# Patient Record
Sex: MALE | Race: WHITE | NOT HISPANIC OR LATINO | URBAN - NONMETROPOLITAN AREA
[De-identification: names, ages, dates, MRNs, and addresses within clinical notes are randomized per-mention and may not be internally consistent; named-entity substitution may affect disease eponyms.]

---

## 2018-09-11 ENCOUNTER — TELEPHONE (OUTPATIENT)
Dept: URGENT CARE | Facility: CLINIC | Age: 48
End: 2018-09-11

## 2021-06-23 DIAGNOSIS — Z12.11 SCREENING FOR COLON CANCER: Primary | ICD-10-CM

## 2021-07-08 ENCOUNTER — OUTSIDE FACILITY SERVICE (OUTPATIENT)
Dept: GASTROENTEROLOGY | Facility: CLINIC | Age: 51
End: 2021-07-08

## 2021-07-08 PROCEDURE — 88305 TISSUE EXAM BY PATHOLOGIST: CPT | Performed by: INTERNAL MEDICINE

## 2021-07-08 PROCEDURE — 45380 COLONOSCOPY AND BIOPSY: CPT | Performed by: INTERNAL MEDICINE

## 2021-07-09 ENCOUNTER — LAB REQUISITION (OUTPATIENT)
Dept: LAB | Facility: HOSPITAL | Age: 51
End: 2021-07-09

## 2021-07-09 DIAGNOSIS — Z12.11 ENCOUNTER FOR SCREENING FOR MALIGNANT NEOPLASM OF COLON: ICD-10-CM

## 2021-07-12 LAB
CYTO UR: NORMAL
LAB AP CASE REPORT: NORMAL
LAB AP CLINICAL INFORMATION: NORMAL
LAB AP DIAGNOSIS COMMENT: NORMAL
PATH REPORT.FINAL DX SPEC: NORMAL
PATH REPORT.GROSS SPEC: NORMAL

## 2021-07-13 ENCOUNTER — TELEPHONE (OUTPATIENT)
Dept: GASTROENTEROLOGY | Facility: CLINIC | Age: 51
End: 2021-07-13

## 2021-07-14 ENCOUNTER — TELEPHONE (OUTPATIENT)
Dept: GASTROENTEROLOGY | Facility: CLINIC | Age: 51
End: 2021-07-14

## 2021-07-14 NOTE — TELEPHONE ENCOUNTER
I spoke with Mr. Vazquez this afternoon regarding the pathology.  There was an ulcer on ileocecal valve.  He denies taking any nonsteroidal medication a regular basis.  There is no known history of influenza bowel disease in the family.  The other colon biopsies were all normal in addition to the terminal ileum biopsy which was normal.  I stated this certainly can be due to NSAIDs or an infectious etiology.  The plan at this time will be for the patient to return to the office for a follow-up visit in 6 months.

## 2022-01-13 ENCOUNTER — OFFICE VISIT (OUTPATIENT)
Dept: GASTROENTEROLOGY | Facility: CLINIC | Age: 52
End: 2022-01-13

## 2022-01-13 DIAGNOSIS — K64.8 INTERNAL HEMORRHOIDS: Primary | ICD-10-CM

## 2022-01-13 PROCEDURE — 99212 OFFICE O/P EST SF 10 MIN: CPT | Performed by: INTERNAL MEDICINE

## 2022-01-13 RX ORDER — PHENAZOPYRIDINE HYDROCHLORIDE 95 MG/1
TABLET ORAL
COMMUNITY
End: 2023-01-13

## 2022-01-13 NOTE — PROGRESS NOTES
You have chosen to receive care through a telephone visit. Do you consent to use a telephone visit for your medical care today? Yes  Patient Name: Joo Vazquez  YOB: 1970   Medical Record number: [id number]     PCP: Provider, No Known    Chief Complaint   Patient presents with   • Follow-up     6 month    Follow-up from colonoscopy.    History of Present Illness:   HPI  This was a telephone visit.  The patient consented for telephone visit.  Mr. Vazquez is doing well at this time.  He denies any problems with diarrhea or constipation.  There is no history of vonda blood in the stool.  Mr. Vazquez has no abdominal pain at this time.  The patient a good appetite.  There is no history of unexplained weight loss.  He does take flaxseed and some other multivitamins.  The patient denies any current use of nonsteroidal medications.  Past Medical History:   Diagnosis Date   • COVID-19 01/02/2022       Past Surgical History:   Procedure Laterality Date   • COLONOSCOPY           Current Outpatient Medications:   •  Flaxseed Oil oil, 1,200 mg., Disp: , Rfl:   •  Glucosamine-Chondroit-Vit C-Mn (GLUCOSAMINE 1500 COMPLEX PO), Take  by mouth 2 (Two) Times a Day., Disp: , Rfl:   •  clotrimazole (LOTRIMIN AF) 1 % cream, Apply to affected area bid, Disp: 30 g, Rfl: 0  •  fluconazole (DIFLUCAN) 150 MG tablet, 1 po q 3 days, Disp: 3 tablet, Rfl: 1  •  fluconazole (DIFLUCAN) 150 MG tablet, Take 1 tablet by mouth Daily., Disp: 3 tablet, Rfl: 0  •  phenazopyridine (PYRIDIUM) 95 MG tablet, Take  by mouth., Disp: , Rfl:   •  Sod Picosulfate-Mag Ox-Cit Acd 10-3.5-12 MG-GM -GM/160ML solution, Take 1 kit by mouth Take As Directed. Follow instructions mailed to your home. If you didn't receive instructions; call [phone]., Disp: 320 mL, Rfl: 0    No Known Allergies    Family History   Problem Relation Age of Onset   • Diabetes Mother    • Hyperlipidemia Father        Social History     Socioeconomic History   • Marital  status:    Tobacco Use   • Smoking status: Never Smoker   • Smokeless tobacco: Never Used   Vaping Use   • Vaping Use: Never used   Substance and Sexual Activity   • Alcohol use: No       Review of Systems    There were no vitals filed for this visit.    Physical Exam    Diagnoses and all orders for this visit:    1. Internal hemorrhoids (Primary)    The patient was found to have internal hemorrhoids on the colonoscopy exam.  There was also an ileocecal valve ulcer that likely was related to preparation and nonsteroidal medication.  Biopsies were unremarkable from the colon and the terminal ileum.  There is no family history of inflammatory bowel disease.  He denies any current gastrointestinal complaints.    Plan: The patient will follow-up in the office on an as-needed basis.           The patient was encouraged to seek a primary care physician.      This was a telephone visit for 10 minutes.

## 2023-01-13 ENCOUNTER — OFFICE VISIT (OUTPATIENT)
Dept: FAMILY MEDICINE CLINIC | Facility: CLINIC | Age: 53
End: 2023-01-13

## 2023-01-13 VITALS — BODY MASS INDEX: 26.79 KG/M2 | HEART RATE: 67 BPM | WEIGHT: 220 LBS | OXYGEN SATURATION: 96 % | HEIGHT: 76 IN

## 2023-01-13 DIAGNOSIS — Z13.220 LIPID SCREENING: ICD-10-CM

## 2023-01-13 DIAGNOSIS — Z12.5 PROSTATE CANCER SCREENING: ICD-10-CM

## 2023-01-13 DIAGNOSIS — Z00.00 ROUTINE GENERAL MEDICAL EXAMINATION AT A HEALTH CARE FACILITY: Primary | ICD-10-CM

## 2023-01-13 PROCEDURE — 36415 COLL VENOUS BLD VENIPUNCTURE: CPT | Performed by: FAMILY MEDICINE

## 2023-01-13 PROCEDURE — 3008F BODY MASS INDEX DOCD: CPT | Performed by: FAMILY MEDICINE

## 2023-01-13 PROCEDURE — 2014F MENTAL STATUS ASSESS: CPT | Performed by: FAMILY MEDICINE

## 2023-01-13 PROCEDURE — 99386 PREV VISIT NEW AGE 40-64: CPT | Performed by: FAMILY MEDICINE

## 2023-01-13 NOTE — PROGRESS NOTES
"Chief Complaint  Establish Care    Subjective          Joo Vazquez presents to [facility]  History of Present Illness  Patient comes in today to establish care he wants a physical done as well he reports he had a colonoscopy probably 2 years ago he has had some mild symptoms of BPH for 5 or 6 years he states otherwise has been pretty healthy he does basically is a  he does saying he says otherwise he has been relatively healthy as far as he knows he still does some exercise and is quite active      Objective   Vital Signs:   Pulse 67   Ht 193 cm (76\")   Wt 99.8 kg (220 lb)   SpO2 96%   BMI 26.78 kg/m²     Body mass index is 26.78 kg/m².    Review of Systems   Constitutional: Negative.    HENT: Negative for congestion, dental problem, ear discharge, ear pain and sore throat.    Respiratory: Negative for apnea, chest tightness and shortness of breath.    Gastrointestinal: Negative for constipation and nausea.   Endocrine: Negative for polyuria.   Genitourinary: Positive for frequency. Negative for difficulty urinating.   Musculoskeletal: Negative for arthralgias and gait problem.   Skin: Negative for rash.   Hematological: Negative for adenopathy.       Past History:  Medical History: has a past medical history of COVID-19 (01/02/2022).   Surgical History: has a past surgical history that includes Colonoscopy.         Current Outpatient Medications:   •  Flaxseed Oil oil, 1,200 mg., Disp: , Rfl:   •  Glucosamine-Chondroit-Vit C-Mn (GLUCOSAMINE 1500 COMPLEX PO), Take  by mouth 2 (Two) Times a Day., Disp: , Rfl:     Allergies: Patient has no known allergies.    Physical Exam  Vitals reviewed.   Constitutional:       Appearance: Normal appearance.   HENT:      Head: Normocephalic.      Right Ear: Tympanic membrane, ear canal and external ear normal.      Left Ear: Tympanic membrane, ear canal and external ear normal.      Nose: Nose normal.      Mouth/Throat:      Pharynx: " Oropharynx is clear.   Eyes:      Pupils: Pupils are equal, round, and reactive to light.   Cardiovascular:      Rate and Rhythm: Normal rate and regular rhythm.      Pulses: Normal pulses.   Pulmonary:      Effort: Pulmonary effort is normal.      Breath sounds: Normal breath sounds.   Abdominal:      General: Abdomen is flat. Bowel sounds are normal.      Palpations: Abdomen is soft.   Musculoskeletal:         General: Normal range of motion.   Skin:     General: Skin is warm and dry.   Neurological:      General: No focal deficit present.      Mental Status: He is alert and oriented to person, place, and time.          Result Review :                   Assessment and Plan    Diagnoses and all orders for this visit:    1. Routine general medical examination at a health care facility (Primary)  Comments:  Discussed diet exercise activity colonoscopy up-to-date we will get blood work  Orders:  -     Comprehensive Metabolic Panel; Future  -     Comprehensive Metabolic Panel    2. Lipid screening  Comments:  We will get blood work and monitor  Orders:  -     Lipid Panel; Future  -     Lipid Panel    3. Prostate cancer screening  Comments:  Discussed symptoms and will monitor  Orders:  -     PSA Screen; Future  -     PSA Screen    Updated annual wellness visit checklist.  Immunizations discussed.  Screening up-to-date.  Recommend yearly dental and eye exams. Also discussed monitoring of blood pressure and lipids.            Follow Up   No follow-ups on file.  Patient was given instructions and counseling regarding his condition or for health maintenance advice. Please see specific information pulled into the AVS if appropriate.     Salomón Obrien MD

## 2023-01-14 LAB
ALBUMIN SERPL-MCNC: 4.7 G/DL (ref 3.8–4.9)
ALBUMIN/GLOB SERPL: 2.2 {RATIO} (ref 1.2–2.2)
ALP SERPL-CCNC: 75 IU/L (ref 44–121)
ALT SERPL-CCNC: 17 IU/L (ref 0–44)
AST SERPL-CCNC: 18 IU/L (ref 0–40)
BILIRUB SERPL-MCNC: 0.5 MG/DL (ref 0–1.2)
BUN SERPL-MCNC: 12 MG/DL (ref 6–24)
BUN/CREAT SERPL: 12 (ref 9–20)
CALCIUM SERPL-MCNC: 9.7 MG/DL (ref 8.7–10.2)
CHLORIDE SERPL-SCNC: 104 MMOL/L (ref 96–106)
CHOLEST SERPL-MCNC: 198 MG/DL (ref 100–199)
CO2 SERPL-SCNC: 24 MMOL/L (ref 20–29)
CREAT SERPL-MCNC: 1.04 MG/DL (ref 0.76–1.27)
EGFRCR SERPLBLD CKD-EPI 2021: 86 ML/MIN/1.73
GLOBULIN SER CALC-MCNC: 2.1 G/DL (ref 1.5–4.5)
GLUCOSE SERPL-MCNC: 94 MG/DL (ref 70–99)
HDLC SERPL-MCNC: 44 MG/DL
LDLC SERPL CALC-MCNC: 130 MG/DL (ref 0–99)
POTASSIUM SERPL-SCNC: 5.2 MMOL/L (ref 3.5–5.2)
PROT SERPL-MCNC: 6.8 G/DL (ref 6–8.5)
PSA SERPL-MCNC: 0.7 NG/ML (ref 0–4)
SODIUM SERPL-SCNC: 144 MMOL/L (ref 134–144)
TRIGL SERPL-MCNC: 136 MG/DL (ref 0–149)
VLDLC SERPL CALC-MCNC: 24 MG/DL (ref 5–40)

## 2023-12-19 ENCOUNTER — TELEMEDICINE (OUTPATIENT)
Dept: FAMILY MEDICINE CLINIC | Facility: TELEHEALTH | Age: 53
End: 2023-12-19

## 2023-12-19 DIAGNOSIS — R11.2 NAUSEA AND VOMITING, UNSPECIFIED VOMITING TYPE: Primary | ICD-10-CM

## 2023-12-19 DIAGNOSIS — R19.7 DIARRHEA, UNSPECIFIED TYPE: ICD-10-CM

## 2023-12-19 DIAGNOSIS — R50.9 FEVER, UNSPECIFIED FEVER CAUSE: ICD-10-CM

## 2023-12-19 DIAGNOSIS — R52 BODY ACHES: ICD-10-CM

## 2023-12-19 RX ORDER — ONDANSETRON 4 MG/1
4 TABLET, ORALLY DISINTEGRATING ORAL EVERY 8 HOURS PRN
Qty: 9 TABLET | Refills: 0 | Status: SHIPPED | OUTPATIENT
Start: 2023-12-19

## 2023-12-19 NOTE — PROGRESS NOTES
You have chosen to receive care through a telehealth visit.  Do you consent to use a video/audio connection for your medical care today? Yes     HPI  Joo Vazquez is a 53 y.o. male  presents with complaint of nausea and vomiting that began last night with associated diarrhea, fever and body aches. Today the diarrhea is persisting but the nausea and vomiting is improved. He reports no headache, sore throat, cough or chest congestion. He does feel very tired and fatigued. Max temp has been 100.5. he is taking clear liquids and has eaten a BRAT diet today. He reports no known illness exposure.     Review of Systems   Constitutional:  Positive for activity change (decreased), appetite change (decreased), fatigue and fever.   HENT: Negative.     Respiratory: Negative.     Cardiovascular: Negative.    Gastrointestinal:  Positive for diarrhea, nausea and vomiting. Negative for abdominal distention, abdominal pain and constipation.   Musculoskeletal:  Positive for myalgias.   Skin: Negative.    Neurological:  Negative for headaches.   Psychiatric/Behavioral: Negative.         Past Medical History:   Diagnosis Date    COVID-19 01/02/2022       Family History   Problem Relation Age of Onset    Diabetes Mother     Hyperlipidemia Father        Social History     Socioeconomic History    Marital status:    Tobacco Use    Smoking status: Never    Smokeless tobacco: Never   Vaping Use    Vaping Use: Never used   Substance and Sexual Activity    Alcohol use: Not Currently    Drug use: Never    Sexual activity: Yes     Partners: Female         There were no vitals taken for this visit.    PHYSICAL EXAM  Physical Exam   Constitutional: He is oriented to person, place, and time. He appears well-developed and well-nourished. He does not have a sickly appearance. He does not appear ill. No distress.   HENT:   Head: Normocephalic and atraumatic.   Pulmonary/Chest: Effort normal.  No respiratory distress.  Abdominal: He exhibits  no distension. There is no abdominal tenderness. There is no CVA tenderness.   Neurological: He is alert and oriented to person, place, and time.   Psychiatric: He has a normal mood and affect.   Vitals reviewed.      Diagnoses and all orders for this visit:    1. Nausea and vomiting, unspecified vomiting type (Primary)  -     ondansetron ODT (ZOFRAN-ODT) 4 MG disintegrating tablet; Place 1 tablet on the tongue Every 8 (Eight) Hours As Needed for Nausea or Vomiting.  Dispense: 9 tablet; Refill: 0    2. Diarrhea, unspecified type    3. Fever, unspecified fever cause    4. Body aches    Influenza vs. AGE   Clear liquids and rest.   May start Imodium AD if the diarrhea persist tomorrow.   Stay hydrated and continue BRAT diet as tolerated.       FOLLOW-UP  As discussed during visit with [facility] if symptoms worsen or fail to improve, follow-up with PCP/Urgent Care/Emergency Department.    Patient verbalizes understanding of medications, instructions for treatment and follow-up.    Francesca Holman, APRN  12/19/2023  18:13 EST    The use of a video visit has been reviewed with the patient and verbal informed consent has been obtained. Myself and Joo Vazquez participated in this visit. The patient is located in [facility] , and I am located in [place], [place]. [employer]  were utilized.

## 2024-08-05 ENCOUNTER — PATIENT ROUNDING (BHMG ONLY) (OUTPATIENT)
Dept: URGENT CARE | Facility: CLINIC | Age: 54
End: 2024-08-05

## 2025-01-29 ENCOUNTER — TELEMEDICINE (OUTPATIENT)
Dept: FAMILY MEDICINE CLINIC | Facility: TELEHEALTH | Age: 55
End: 2025-01-29

## 2025-01-29 DIAGNOSIS — J06.9 VIRAL URI: Primary | ICD-10-CM

## 2025-01-29 DIAGNOSIS — R68.89 FLU-LIKE SYMPTOMS: ICD-10-CM

## 2025-01-29 RX ORDER — BROMPHENIRAMINE MALEATE, PSEUDOEPHEDRINE HYDROCHLORIDE, AND DEXTROMETHORPHAN HYDROBROMIDE 2; 30; 10 MG/5ML; MG/5ML; MG/5ML
10 SYRUP ORAL 3 TIMES DAILY PRN
Qty: 118 ML | Refills: 0 | Status: SHIPPED | OUTPATIENT
Start: 2025-01-29

## 2025-01-29 RX ORDER — OSELTAMIVIR PHOSPHATE 75 MG/1
75 CAPSULE ORAL 2 TIMES DAILY
Qty: 10 CAPSULE | Refills: 0 | Status: SHIPPED | OUTPATIENT
Start: 2025-01-29 | End: 2025-02-03

## 2025-01-29 NOTE — PATIENT INSTRUCTIONS
Continue to treat symptoms.  Alternate tylenol and motrin for pain and/or fever, stay hydrated and rest.     If symptoms worsen or do not improve follow up with your PCP or visit your nearest Urgent Care Center or ER.